# Patient Record
Sex: MALE | Race: WHITE | Employment: FULL TIME
[De-identification: names, ages, dates, MRNs, and addresses within clinical notes are randomized per-mention and may not be internally consistent; named-entity substitution may affect disease eponyms.]

---

## 2017-03-31 DIAGNOSIS — M25.551 HIP PAIN, RIGHT: ICD-10-CM

## 2017-03-31 DIAGNOSIS — Z79.82 BLEEDING RISK DUE TO COUMADIN AND ASPIRIN: Primary | ICD-10-CM

## 2017-03-31 DIAGNOSIS — Z79.01 BLEEDING RISK DUE TO COUMADIN AND ASPIRIN: Primary | ICD-10-CM

## 2017-03-31 LAB — INR PPP: 0.93 (ref 0.86–1.14)

## 2017-03-31 PROCEDURE — 85610 PROTHROMBIN TIME: CPT | Performed by: ORTHOPAEDIC SURGERY

## 2017-03-31 PROCEDURE — 36415 COLL VENOUS BLD VENIPUNCTURE: CPT | Performed by: ORTHOPAEDIC SURGERY

## 2017-04-03 DIAGNOSIS — Z79.82 BLEEDING RISK DUE TO COUMADIN AND ASPIRIN: ICD-10-CM

## 2017-04-03 DIAGNOSIS — M25.551 HIP PAIN, RIGHT: ICD-10-CM

## 2017-04-03 DIAGNOSIS — Z79.01 BLEEDING RISK DUE TO COUMADIN AND ASPIRIN: ICD-10-CM

## 2017-04-03 LAB — INR BLD: 1.1 (ref 0.86–1.14)

## 2017-04-03 PROCEDURE — 85610 PROTHROMBIN TIME: CPT | Mod: QW | Performed by: ORTHOPAEDIC SURGERY

## 2017-04-03 PROCEDURE — 36416 COLLJ CAPILLARY BLOOD SPEC: CPT | Performed by: ORTHOPAEDIC SURGERY

## 2017-04-06 DIAGNOSIS — Z79.01 BLEEDING RISK DUE TO COUMADIN AND ASPIRIN: ICD-10-CM

## 2017-04-06 DIAGNOSIS — Z79.82 BLEEDING RISK DUE TO COUMADIN AND ASPIRIN: ICD-10-CM

## 2017-04-06 DIAGNOSIS — M25.551 HIP PAIN, RIGHT: ICD-10-CM

## 2017-04-06 LAB — INR BLD: 1.6 (ref 0.86–1.14)

## 2017-04-06 PROCEDURE — 36416 COLLJ CAPILLARY BLOOD SPEC: CPT | Performed by: ORTHOPAEDIC SURGERY

## 2017-04-06 PROCEDURE — 85610 PROTHROMBIN TIME: CPT | Mod: QW | Performed by: ORTHOPAEDIC SURGERY

## 2017-04-10 DIAGNOSIS — Z79.82 BLEEDING RISK DUE TO COUMADIN AND ASPIRIN: ICD-10-CM

## 2017-04-10 DIAGNOSIS — Z79.01 BLEEDING RISK DUE TO COUMADIN AND ASPIRIN: ICD-10-CM

## 2017-04-10 DIAGNOSIS — M25.551 HIP PAIN, RIGHT: ICD-10-CM

## 2017-04-10 LAB — INR BLD: 2.3 (ref 0.86–1.14)

## 2017-04-10 PROCEDURE — 85610 PROTHROMBIN TIME: CPT | Mod: QW | Performed by: ORTHOPAEDIC SURGERY

## 2017-04-10 PROCEDURE — 36416 COLLJ CAPILLARY BLOOD SPEC: CPT | Performed by: ORTHOPAEDIC SURGERY

## 2017-04-13 DIAGNOSIS — M25.551 HIP PAIN, RIGHT: ICD-10-CM

## 2017-04-13 DIAGNOSIS — Z79.82 BLEEDING RISK DUE TO COUMADIN AND ASPIRIN: ICD-10-CM

## 2017-04-13 DIAGNOSIS — Z79.01 BLEEDING RISK DUE TO COUMADIN AND ASPIRIN: ICD-10-CM

## 2017-04-13 LAB — INR BLD: 2 (ref 0.86–1.14)

## 2017-04-13 PROCEDURE — 85610 PROTHROMBIN TIME: CPT | Mod: QW | Performed by: ORTHOPAEDIC SURGERY

## 2017-04-13 PROCEDURE — 36416 COLLJ CAPILLARY BLOOD SPEC: CPT | Performed by: ORTHOPAEDIC SURGERY

## 2017-04-17 DIAGNOSIS — Z79.82 BLEEDING RISK DUE TO COUMADIN AND ASPIRIN: ICD-10-CM

## 2017-04-17 DIAGNOSIS — M25.551 HIP PAIN, RIGHT: ICD-10-CM

## 2017-04-17 DIAGNOSIS — Z79.01 BLEEDING RISK DUE TO COUMADIN AND ASPIRIN: ICD-10-CM

## 2017-04-17 LAB — INR BLD: 1.7 (ref 0.86–1.14)

## 2017-04-17 PROCEDURE — 85610 PROTHROMBIN TIME: CPT | Mod: QW | Performed by: ORTHOPAEDIC SURGERY

## 2017-04-17 PROCEDURE — 36416 COLLJ CAPILLARY BLOOD SPEC: CPT | Performed by: ORTHOPAEDIC SURGERY

## 2017-04-20 DIAGNOSIS — Z79.01 LONG TERM (CURRENT) USE OF ANTICOAGULANTS: Primary | ICD-10-CM

## 2017-04-20 LAB — INR BLD: 1.6 (ref 0.86–1.14)

## 2017-04-20 PROCEDURE — 36416 COLLJ CAPILLARY BLOOD SPEC: CPT | Performed by: ORTHOPAEDIC SURGERY

## 2017-04-20 PROCEDURE — 85610 PROTHROMBIN TIME: CPT | Mod: QW | Performed by: ORTHOPAEDIC SURGERY

## 2017-04-24 DIAGNOSIS — Z79.01 LONG TERM (CURRENT) USE OF ANTICOAGULANTS: ICD-10-CM

## 2017-04-24 LAB — INR BLD: 1.7 (ref 0.86–1.14)

## 2017-04-24 PROCEDURE — 85610 PROTHROMBIN TIME: CPT | Mod: QW | Performed by: ORTHOPAEDIC SURGERY

## 2017-04-24 PROCEDURE — 36416 COLLJ CAPILLARY BLOOD SPEC: CPT | Performed by: ORTHOPAEDIC SURGERY

## 2022-02-04 ENCOUNTER — TRANSCRIBE ORDERS (OUTPATIENT)
Dept: OTHER | Age: 57
End: 2022-02-04
Payer: COMMERCIAL

## 2022-02-04 DIAGNOSIS — Z98.1 ARTHRODESIS STATUS: Primary | ICD-10-CM

## 2022-02-14 ENCOUNTER — HOSPITAL ENCOUNTER (OUTPATIENT)
Dept: PHYSICAL THERAPY | Facility: CLINIC | Age: 57
Setting detail: THERAPIES SERIES
End: 2022-02-14
Attending: PHYSICIAN ASSISTANT
Payer: COMMERCIAL

## 2022-02-14 DIAGNOSIS — Z98.1 ARTHRODESIS STATUS: ICD-10-CM

## 2022-02-14 PROCEDURE — 97110 THERAPEUTIC EXERCISES: CPT | Mod: GP | Performed by: PHYSICAL THERAPIST

## 2022-02-14 PROCEDURE — 97530 THERAPEUTIC ACTIVITIES: CPT | Mod: GP | Performed by: PHYSICAL THERAPIST

## 2022-02-14 PROCEDURE — 97161 PT EVAL LOW COMPLEX 20 MIN: CPT | Mod: GP | Performed by: PHYSICAL THERAPIST

## 2022-02-14 NOTE — PROGRESS NOTES
02/14/22 0800   General Information   Type of Visit Initial OP Ortho PT Evaluation   Start of Care Date 02/14/22   Referring Physician Dr. Alex Hamilton   Orders Evaluate and Treat   Date of Order 02/04/22   Certification Required? No   Medical Diagnosis S/p L4-5 lumbar fusion   Surgical/Medical history reviewed Yes   Precautions/Limitations spinal precautions   Weight-Bearing Status - LUE full weight-bearing   Weight-Bearing Status - RUE full weight-bearing   Weight-Bearing Status - LLE full weight-bearing   Weight-Bearing Status - RLE full weight-bearing   Body Part(s)   Body Part(s) Lumbar Spine/SI   Presentation and Etiology   Pertinent history of current problem (include personal factors and/or comorbidities that impact the POC) Pt presented to PT s/p Lumbar fusion on 1/5/22.  Pt notes that he has occasional L hip pain and rarely L lower leg pain.  Pt had previously had pain in B LE's prior to surgery.  Pt did report an infection right after surgery but this is doing better.  PMH: cancer, diabetes, L4-5 fusion 1/5/22, R LALITO.     Impairments A. Pain;D. Decreased ROM;E. Decreased flexibility;F. Decreased strength and endurance   Functional Limitations perform activities of daily living;perform required work activities;perform desired leisure / sports activities   Symptom Location L hip and lower leg.   How/Where did it occur From insidious onset   Onset date of current episode/exacerbation 01/05/22   Chronicity New   Pain rating (0-10 point scale) Best (/10);Worst (/10)   Best (/10) 2   Worst (/10) 8   Pain quality A. Sharp;C. Aching   Frequency of pain/symptoms B. Intermittent   Pain/symptoms are: The same all the time   Pain/symptoms exacerbated by A. Sitting;G. Certain positions   Pain/symptoms eased by B. Walking;H. Cold;I. OTC medication(s);J. Braces/supports   Progression of symptoms since onset: Improved   Current Level of Function   Current Community Support Family/friend caregiver   Patient  role/employment history A. Employed   Employment Comments working half days, , working at home   [facility]   Home/community accessibility 1 flight of stairs   Current equipment-Gait/Locomotion None   Current equipment-ADL None   Fall Risk Screen   Fall screen completed by PT   Have you fallen 2 or more times in the past year? No   Have you fallen and had an injury in the past year? No   Abuse Screen (yes response referral indicated)   Feels Unsafe at Home or Work/School no   Feels Threatened by Someone no   Does Anyone Try to Keep You From Having Contact with Others or Doing Things Outside Your Home? no   Physical Signs of Abuse Present no   Lumbar Spine/SI Objective Findings   Observation Pt wearing lumbar brace.     Gait/Locomotion No gait abnormalities identified.   Flexion ROM NT   Extension ROM NT   Right Side Bending ROM NT   Left Side Bending ROM NT   Repeated Extension-Standing ROM NT   Repeated Flexion-Standing ROM NT   Lumbar ROM Comment NT d/t post-op fusion.   Hip Flexion (L2) Strength 4/5 R   Hip Abduction Strength 4/5 B   Hip Extension Strength 4/5 B   Knee Flexion Strength 5/5   Knee Extension (L3) Strength 5/5   Ankle Dorsiflexion (L4) Strength 5/5   Great Toe Extension (L5) Strength 5/5   Ankle Plantar Flexion (S1) Strength 5/5   Patellar Tendon Reflexes  2+   Achilles Tendon Reflexes 2+   Planned Therapy Interventions   Planned Therapy Interventions manual therapy;motor coordination training;neuromuscular re-education;ROM;strengthening;stretching   Planned Modality Interventions   Planned Modality Interventions Cryotherapy;Electrical stimulation;Hot packs;TENS;Ultrasound   Planned Modality Interventions Comments as needed   Clinical Impression   Criteria for Skilled Therapeutic Interventions Met yes, treatment indicated   PT Diagnosis Low back pain   Influenced by the following impairments Pain, weakness, decreased activity tolerance   Functional limitations due to  impairments Prolonged sitting, stairs, prolonged standing.   Clinical Presentation Stable/Uncomplicated   Clinical Presentation Rationale Clinical judgement   Clinical Decision Making (Complexity) Low complexity   Therapy Frequency 2 times/Week   Predicted Duration of Therapy Intervention (days/wks) 12 weeks   Risk & Benefits of therapy have been explained Yes   Patient, Family & other staff in agreement with plan of care Yes   Clinical Impression Comments Pt is a 56 y.o. male who presented to PT with symptoms of L LE pain and weakness s/p Lumbar fusion.  Pt will benefit from skilled PT to improve lumbar motor control, posture/core stability, and symptom mangement techniques as indicated.   Education Assessment   Preferred Learning Style Listening;Demonstration;Pictures/video   Barriers to Learning No barriers   ORTHO GOALS   PT Ortho Eval Goals 1;2;3   Ortho Goal 1   Goal Identifier BOUCHRA   Goal Description Pt will demonstrate 10% improvement per BOUCHRA in order to demonstrate functional improvement of low back.   Target Date 05/09/22   Ortho Goal 2   Goal Identifier HEP   Goal Description Pt will be independent with HEP in order to improve lumbar motor control.   Target Date 03/28/22   Ortho Goal 3   Goal Identifier Ergonomics   Goal Description Pt will report more ideal ergonomic home work station in order to improve tolerance to work duties.   Target Date 03/28/22   Total Evaluation Time   PT Keegan Low Complexity Minutes (79146) 15

## 2022-02-17 ENCOUNTER — HOSPITAL ENCOUNTER (OUTPATIENT)
Dept: PHYSICAL THERAPY | Facility: CLINIC | Age: 57
Setting detail: THERAPIES SERIES
End: 2022-02-17
Attending: PHYSICIAN ASSISTANT
Payer: COMMERCIAL

## 2022-02-17 PROCEDURE — 97530 THERAPEUTIC ACTIVITIES: CPT | Mod: GP | Performed by: PHYSICAL THERAPIST

## 2022-02-17 PROCEDURE — 97110 THERAPEUTIC EXERCISES: CPT | Mod: GP | Performed by: PHYSICAL THERAPIST

## 2022-02-17 PROCEDURE — 97112 NEUROMUSCULAR REEDUCATION: CPT | Mod: GP | Performed by: PHYSICAL THERAPIST

## 2022-02-25 ENCOUNTER — HOSPITAL ENCOUNTER (OUTPATIENT)
Dept: PHYSICAL THERAPY | Facility: CLINIC | Age: 57
Setting detail: THERAPIES SERIES
End: 2022-02-25
Attending: PHYSICIAN ASSISTANT
Payer: COMMERCIAL

## 2022-02-25 PROCEDURE — 97112 NEUROMUSCULAR REEDUCATION: CPT | Mod: GP | Performed by: PHYSICAL THERAPIST

## 2022-02-25 PROCEDURE — 97110 THERAPEUTIC EXERCISES: CPT | Mod: GP | Performed by: PHYSICAL THERAPIST

## 2022-02-28 ENCOUNTER — HOSPITAL ENCOUNTER (OUTPATIENT)
Dept: PHYSICAL THERAPY | Facility: CLINIC | Age: 57
Setting detail: THERAPIES SERIES
End: 2022-02-28
Attending: PHYSICIAN ASSISTANT
Payer: COMMERCIAL

## 2022-02-28 PROCEDURE — 97110 THERAPEUTIC EXERCISES: CPT | Mod: GP | Performed by: PHYSICAL THERAPIST

## 2022-02-28 PROCEDURE — 97530 THERAPEUTIC ACTIVITIES: CPT | Mod: GP | Performed by: PHYSICAL THERAPIST

## 2022-03-03 ENCOUNTER — HOSPITAL ENCOUNTER (OUTPATIENT)
Dept: PHYSICAL THERAPY | Facility: CLINIC | Age: 57
Setting detail: THERAPIES SERIES
End: 2022-03-03
Attending: PHYSICIAN ASSISTANT
Payer: COMMERCIAL

## 2022-03-03 PROCEDURE — 97110 THERAPEUTIC EXERCISES: CPT | Mod: GP | Performed by: PHYSICAL THERAPIST

## 2022-03-03 PROCEDURE — 97530 THERAPEUTIC ACTIVITIES: CPT | Mod: GP | Performed by: PHYSICAL THERAPIST

## 2022-03-14 ENCOUNTER — HOSPITAL ENCOUNTER (OUTPATIENT)
Dept: PHYSICAL THERAPY | Facility: CLINIC | Age: 57
Setting detail: THERAPIES SERIES
Discharge: HOME | End: 2022-03-14
Attending: PHYSICIAN ASSISTANT
Payer: COMMERCIAL

## 2022-03-14 PROCEDURE — 97110 THERAPEUTIC EXERCISES: CPT | Mod: GP | Performed by: PHYSICAL THERAPIST

## 2022-03-14 PROCEDURE — 97530 THERAPEUTIC ACTIVITIES: CPT | Mod: GP | Performed by: PHYSICAL THERAPIST

## 2022-03-16 ENCOUNTER — HOSPITAL ENCOUNTER (OUTPATIENT)
Dept: PHYSICAL THERAPY | Facility: CLINIC | Age: 57
Setting detail: THERAPIES SERIES
Discharge: HOME | End: 2022-03-16
Attending: PHYSICIAN ASSISTANT
Payer: COMMERCIAL

## 2022-03-16 PROCEDURE — 97112 NEUROMUSCULAR REEDUCATION: CPT | Mod: GP | Performed by: PHYSICAL THERAPIST

## 2022-03-16 PROCEDURE — 97110 THERAPEUTIC EXERCISES: CPT | Mod: GP | Performed by: PHYSICAL THERAPIST

## 2022-03-23 ENCOUNTER — HOSPITAL ENCOUNTER (OUTPATIENT)
Dept: PHYSICAL THERAPY | Facility: CLINIC | Age: 57
Setting detail: THERAPIES SERIES
Discharge: HOME | End: 2022-03-23
Attending: PHYSICIAN ASSISTANT
Payer: COMMERCIAL

## 2022-03-23 PROCEDURE — 97112 NEUROMUSCULAR REEDUCATION: CPT | Mod: GP | Performed by: PHYSICAL THERAPIST

## 2022-03-23 PROCEDURE — 97110 THERAPEUTIC EXERCISES: CPT | Mod: GP | Performed by: PHYSICAL THERAPIST

## 2022-03-28 ENCOUNTER — HOSPITAL ENCOUNTER (OUTPATIENT)
Dept: PHYSICAL THERAPY | Facility: CLINIC | Age: 57
Setting detail: THERAPIES SERIES
Discharge: HOME | End: 2022-03-28
Attending: PHYSICIAN ASSISTANT
Payer: COMMERCIAL

## 2022-03-28 PROCEDURE — 97530 THERAPEUTIC ACTIVITIES: CPT | Mod: GP | Performed by: PHYSICAL THERAPIST

## 2022-03-28 PROCEDURE — 97110 THERAPEUTIC EXERCISES: CPT | Mod: GP | Performed by: PHYSICAL THERAPIST

## 2022-04-04 ENCOUNTER — HOSPITAL ENCOUNTER (OUTPATIENT)
Dept: PHYSICAL THERAPY | Facility: CLINIC | Age: 57
Setting detail: THERAPIES SERIES
Discharge: HOME | End: 2022-04-04
Attending: PHYSICIAN ASSISTANT
Payer: COMMERCIAL

## 2022-04-04 PROCEDURE — 97530 THERAPEUTIC ACTIVITIES: CPT | Mod: GP | Performed by: PHYSICAL THERAPIST

## 2022-04-04 PROCEDURE — 97110 THERAPEUTIC EXERCISES: CPT | Mod: GP | Performed by: PHYSICAL THERAPIST

## 2022-04-14 ENCOUNTER — TRANSCRIBE ORDERS (OUTPATIENT)
Dept: OTHER | Age: 57
End: 2022-04-14
Payer: COMMERCIAL

## 2022-04-14 DIAGNOSIS — Z98.1 ARTHRODESIS STATUS: Primary | ICD-10-CM

## 2022-05-02 ENCOUNTER — HOSPITAL ENCOUNTER (OUTPATIENT)
Dept: PHYSICAL THERAPY | Facility: CLINIC | Age: 57
Setting detail: THERAPIES SERIES
Discharge: HOME | End: 2022-05-02
Attending: PHYSICIAN ASSISTANT
Payer: COMMERCIAL

## 2022-05-02 DIAGNOSIS — Z98.1 ARTHRODESIS STATUS: ICD-10-CM

## 2022-05-02 PROCEDURE — 97112 NEUROMUSCULAR REEDUCATION: CPT | Mod: GP | Performed by: PHYSICAL THERAPIST

## 2022-05-02 PROCEDURE — 97110 THERAPEUTIC EXERCISES: CPT | Mod: GP | Performed by: PHYSICAL THERAPIST

## 2022-05-09 ENCOUNTER — HOSPITAL ENCOUNTER (OUTPATIENT)
Dept: PHYSICAL THERAPY | Facility: CLINIC | Age: 57
Setting detail: THERAPIES SERIES
Discharge: HOME | End: 2022-05-09
Attending: PHYSICIAN ASSISTANT
Payer: COMMERCIAL

## 2022-05-09 PROCEDURE — 97110 THERAPEUTIC EXERCISES: CPT | Mod: GP | Performed by: PHYSICAL THERAPIST

## 2022-05-16 ENCOUNTER — HOSPITAL ENCOUNTER (OUTPATIENT)
Dept: PHYSICAL THERAPY | Facility: CLINIC | Age: 57
Setting detail: THERAPIES SERIES
Discharge: HOME | End: 2022-05-16
Attending: PHYSICIAN ASSISTANT
Payer: COMMERCIAL

## 2022-05-16 PROCEDURE — 97110 THERAPEUTIC EXERCISES: CPT | Mod: GP | Performed by: PHYSICAL THERAPIST

## 2022-05-16 PROCEDURE — 97530 THERAPEUTIC ACTIVITIES: CPT | Mod: GP | Performed by: PHYSICAL THERAPIST

## 2022-05-23 ENCOUNTER — HOSPITAL ENCOUNTER (OUTPATIENT)
Dept: PHYSICAL THERAPY | Facility: CLINIC | Age: 57
Setting detail: THERAPIES SERIES
Discharge: HOME | End: 2022-05-23
Attending: PHYSICIAN ASSISTANT
Payer: COMMERCIAL

## 2022-05-23 PROCEDURE — 97530 THERAPEUTIC ACTIVITIES: CPT | Mod: GP | Performed by: PHYSICAL THERAPIST

## 2022-05-23 PROCEDURE — 97110 THERAPEUTIC EXERCISES: CPT | Mod: GP | Performed by: PHYSICAL THERAPIST

## 2022-06-06 ENCOUNTER — HOSPITAL ENCOUNTER (OUTPATIENT)
Dept: PHYSICAL THERAPY | Facility: CLINIC | Age: 57
Setting detail: THERAPIES SERIES
Discharge: HOME | End: 2022-06-06
Attending: PHYSICIAN ASSISTANT
Payer: COMMERCIAL

## 2022-06-06 PROCEDURE — 97110 THERAPEUTIC EXERCISES: CPT | Mod: GP | Performed by: PHYSICAL THERAPIST

## 2022-06-06 PROCEDURE — 97530 THERAPEUTIC ACTIVITIES: CPT | Mod: GP | Performed by: PHYSICAL THERAPIST

## 2022-07-20 ENCOUNTER — TRANSCRIBE ORDERS (OUTPATIENT)
Dept: OTHER | Age: 57
End: 2022-07-20

## 2022-07-20 DIAGNOSIS — Z98.1 ARTHRODESIS STATUS: Primary | ICD-10-CM

## 2022-08-12 ENCOUNTER — HOSPITAL ENCOUNTER (OUTPATIENT)
Dept: PHYSICAL THERAPY | Facility: CLINIC | Age: 57
Setting detail: THERAPIES SERIES
Discharge: HOME | End: 2022-08-12
Attending: PHYSICIAN ASSISTANT
Payer: COMMERCIAL

## 2022-08-12 PROCEDURE — 97530 THERAPEUTIC ACTIVITIES: CPT | Mod: GP | Performed by: PHYSICAL THERAPIST

## 2022-08-12 PROCEDURE — 97110 THERAPEUTIC EXERCISES: CPT | Mod: GP | Performed by: PHYSICAL THERAPIST

## 2022-08-25 NOTE — PROGRESS NOTES
[facility]    Outpatient Physical Therapy Discharge Note  Patient: Saw Hector  : 1965    Beginning/End Dates of Reporting Period:  22 to 22 (pt seen for 16 PT visits in that time, cancelled  and 22)    Referring Provider: Dr. Alex Hamilton    Therapy Diagnosis: Low back pain, s/p lumbar fusion 22     Client Self Report: Pt notes he can not do bending and twisting per restrictions. Pt has been off the brace x 2 weeks but will use if really active. Took MRI and not totally fused and healing in a different different pattern than how it normally heals. Not able to sleep in bed normally yet. Pain L low back. Pain with in L hip with walking more than 15'.  will get L LALITO.    Objective Measurements:22  Objective Measure: Pain  Details: Still gets LBP when lying in bed but most of the time no significant LBP. Main pain is from L hip that he may get a LALITO in October.   Objective Measure: LEFS (at eval 25/80) (31/80 on 3/14/22)(37/80 on 22 (44/80 on 22)  Details: 49/80 on 22        Goals:  Goal Identifier LEFS   Goal Description Pt will demonstrate 10% improvement per LEFS in order to demonstrate functional improvement of low back.   Target Date 22   Date Met  22   Progress (detail required for progress note): 37/80 on 22     Goal Identifier HEP   Goal Description Pt will be independent with HEP in order to improve lumbar motor control.   Target Date 22   Date Met  22   Progress (detail required for progress note): has good understanding of HEP     Goal Identifier Ergonomics   Goal Description Pt will report more ideal ergonomic home work station in order to improve tolerance to work duties.   Target Date 22   Date Met  22   Progress (detail required for progress note): pt has returned to work since 22 and  good toleration when reporting at 5/2/22 visit     Goal Identifier Function   Goal Description pt will further improve strength and carry over to improved functional level as measured by LEFS score increase to 49/80 or higher   Target Date 06/02/22   Date Met  06/06/22   Progress (detail required for progress note): 49/80 6/6/22       Plan:  Discharge from therapy.    Discharge:    Reason for Discharge: Patient has met all goals. PT will be having L LALITO in October of 2022 so pt should continue home program and some of our limitations for further progression of ex/activity is due to L hip DJD.     Equipment Issued: [email or web address]    Discharge Plan: Patient to continue home program.

## 2023-02-20 ENCOUNTER — TRANSCRIBE ORDERS (OUTPATIENT)
Dept: OTHER | Age: 58
End: 2023-02-20

## 2023-02-20 DIAGNOSIS — Z98.1 ARTHRODESIS STATUS: Primary | ICD-10-CM

## 2023-03-24 ENCOUNTER — HOSPITAL ENCOUNTER (OUTPATIENT)
Dept: PHYSICAL THERAPY | Facility: CLINIC | Age: 58
Setting detail: THERAPIES SERIES
Discharge: HOME | End: 2023-03-24
Attending: ORTHOPAEDIC SURGERY
Payer: COMMERCIAL

## 2023-03-24 PROCEDURE — 97161 PT EVAL LOW COMPLEX 20 MIN: CPT | Mod: GP | Performed by: PHYSICAL THERAPIST

## 2023-03-24 PROCEDURE — 97112 NEUROMUSCULAR REEDUCATION: CPT | Mod: GP | Performed by: PHYSICAL THERAPIST

## 2023-03-24 PROCEDURE — 97110 THERAPEUTIC EXERCISES: CPT | Mod: GP | Performed by: PHYSICAL THERAPIST

## 2023-03-24 NOTE — PROGRESS NOTES
"   03/24/23 1300   General Information   Type of Visit Initial OP Ortho PT Evaluation   Start of Care Date 03/24/23   Referring Physician Jeanette Casey MD   Patient/Family Goals Statement feel more level with standing and walking   Orders Evaluate and Treat   Date of Order 02/20/23   Certification Required? No   Medical Diagnosis L 4-5 OLLIF 1/5/22, arthorodesis status   Surgical/Medical history reviewed Yes   Precautions/Limitations   (pt notes he is still not supposed to do heavy lifting, 35# restriction)   Body Part(s)   Body Part(s) Lumbar Spine/SI   Presentation and Etiology   Pertinent history of current problem (include personal factors and/or comorbidities that impact the POC) Pt underwent L 4-5 OLLIF on 1/5/22, prior to that had L LALITO Oct of 2022, R LALITO 2016. After back surgery he felt like his L LE was longer. After L LALITO it did not change the leg length difference but did help with L hip pain. Has a 3/8\" lift for R shoe since February which helps him feel more level. If sore takes tylenol or IBP. Doing hip exercises for strengthening. Pt still noting gradual improvement since surgery and was told the fusion is over 50% fused now.   Impairments A. Pain;H. Impaired gait   Functional Limitations perform activities of daily living;perform required work activities;perform desired leisure / sports activities   Symptom Location B low back, L anterior thigh, gets L frantz horse in calf at night   How/Where did it occur From Degenerative Joint Disease   Onset date of current episode/exacerbation 01/05/22   Chronicity Chronic   Pain rating (0-10 point scale) Best (/10);Worst (/10)   Best (/10) LBP and LE sx usually 0/10   Worst (/10) LBP 5-6/10, LE 3-4/10   Pain quality B. Dull;C. Aching;G. Cramping   Frequency of pain/symptoms B. Intermittent  (LBP 10% or less, LE 5% or less, cramping L calf 2 nights per week)   Pain/symptoms are: Worse during the night   Pain/symptoms exacerbated by B. Walking;J. ADL;K. " "Home tasks;L. Work tasks;M. Other   Pain exacerbation comment standing   Pain/symptoms eased by C. Rest;E. Changing positions   Progression of symptoms since onset: Improved   Prior Level of Function   Prior Level of Function-Mobility ind   Prior Level of Function-ADLs ind   Functional Level Prior Comment was not uneven when walking   Current Level of Function   Patient role/employment history A. Employed   Employment Comments , sits 85% of the day   Fall Risk Screen   Fall screen completed by PT   Have you fallen 2 or more times in the past year? No   Have you fallen and had an injury in the past year? No   Is patient a fall risk? No   Abuse Screen (yes response referral indicated)   Feels Unsafe at Home or Work/School no   Feels Threatened by Someone no   Does Anyone Try to Keep You From Having Contact with Others or Doing Things Outside Your Home? no   Physical Signs of Abuse Present no   Lumbar Spine/SI Objective Findings   Gait/Locomotion lateral lurch present   Lumbar/Hip/Knee/Foot Strength Comments sit to stands in 30\" = 13 reps (normal for age 14-19 reps) pt had less L LE wt bearing with sit to stands   Palpation In hooklying B tibias symmetrical, R distal femur higher, in supine R ASIS lower, in prone L ischial tuberosity lower.   Observation No pain behaviors at rest. 0/10 low back and LE sx in sitting and standing.   Posture in sitting pt leans to R. In standing reduced lumbar lordosis L iliac crest 1\" or more higher, decreased L LE wt bearing, if pt puts equal wt bearing notes low back pull. L Gr troch also higher than R   Planned Therapy Interventions   Planned Therapy Interventions ROM;strengthening;stretching;neuromuscular re-education   Planned Therapy Interventions Comment manual therapy if needed   Clinical Impression   Criteria for Skilled Therapeutic Interventions Met yes, treatment indicated   PT Diagnosis LBP, pelvic obliquity   Influenced by the following impairments pain, ROM, " weakness, R anterior rotation, L posterior rotation   Functional limitations due to impairments standing, walking, adls   Clinical Presentation Stable/Uncomplicated   Clinical Presentation Rationale clinical judgement   Clinical Decision Making (Complexity) Low complexity   Therapy Frequency other (see comments)   Predicted Duration of Therapy Intervention (days/wks) up to 4 visits over the next month   Risk & Benefits of therapy have been explained Yes   Patient, Family & other staff in agreement with plan of care Yes   Education Assessment   Preferred Learning Style Listening   Barriers to Learning No barriers   ORTHO GOALS   PT Ortho Eval Goals 1;2   Ortho Goal 1   Goal Identifier Home program   Goal Description Pt will have good understanding of HEP to help keep his pelvis more level   Target Date 04/24/23   Ortho Goal 2   Goal Identifier Pelvic obliuity   Goal Description pt will maintain pelvic correction so he feels less off with standing and walking with adl's, reporting 50% or greater improvement   Target Date 04/24/23   Total Evaluation Time   PT Eval, Low Complexity Minutes (33233) 20

## 2023-04-14 ENCOUNTER — HOSPITAL ENCOUNTER (OUTPATIENT)
Dept: PHYSICAL THERAPY | Facility: CLINIC | Age: 58
Setting detail: THERAPIES SERIES
Discharge: HOME | End: 2023-04-14
Attending: ORTHOPAEDIC SURGERY
Payer: COMMERCIAL

## 2023-04-14 PROCEDURE — 97110 THERAPEUTIC EXERCISES: CPT | Mod: GP | Performed by: PHYSICAL THERAPIST

## 2023-04-14 PROCEDURE — 97530 THERAPEUTIC ACTIVITIES: CPT | Mod: GP | Performed by: PHYSICAL THERAPIST

## 2023-04-14 PROCEDURE — 97112 NEUROMUSCULAR REEDUCATION: CPT | Mod: GP | Performed by: PHYSICAL THERAPIST

## 2023-04-14 NOTE — PROGRESS NOTES
[facility]    Outpatient Physical Therapy Discharge Note  Patient: Saw Hector  : 1965    Beginning/End Dates of Reporting Period:  3/24/23 to 23 (pt seen for 2 PT visits in that time)    Referring Provider: Jeanette Casey MD    Therapy Diagnosis: LBP, pelvic obliquity     Client Self Report: Moving to [place] soon and leaving [place] so today is the last visti. Quads have been sore. Hasn't had much for pain. Hasn't been wearing shoe lift. Has good understanding of HEP, can DC PT.    Objective Measurements:23  Objective Measure: Sitting posture (leans to R at eval on 3/24/23)  Details: pt more aware of not leaning to R.  Objective Measure: Standing posture (75% R LE wt bearing, L knee flexed, L iliac crest higher at eval)  Details: getting more equal, L iliac crest is slightly higher.  Objective Measure: Pelvic obliquity (R AR, L GA at eval)  Details: level today  Objective Measure: Riley/BOUCHRA  Details: at evaluation on 3/24/23 low Riley 1,2/BOUCHRA 28%         Goals:  Goal Identifier Home program   Goal Description Pt will have good understanding of HEP to help keep his pelvis more level   Target Date 23   Date Met  23   Progress (detail required for progress note): pt with good understanding off HEP at 23 final visit.     Goal Identifier Pelvic obliuity   Goal Description pt will maintain pelvic correction so he feels less off with standing and walking with adl's, reporting 50% or greater improvement   Target Date 23   Date Met  23   Progress (detail required for progress note): notes 50% improvement, more level. at 23 visit       Plan:  Discharge from therapy.    Discharge:    Reason for Discharge: Patient has met all goals.    Equipment Issued: none    Discharge Plan: Patient to continue home program.

## 2023-04-23 ENCOUNTER — HEALTH MAINTENANCE LETTER (OUTPATIENT)
Age: 58
End: 2023-04-23

## 2024-06-30 ENCOUNTER — HEALTH MAINTENANCE LETTER (OUTPATIENT)
Age: 59
End: 2024-06-30

## 2025-07-13 ENCOUNTER — HEALTH MAINTENANCE LETTER (OUTPATIENT)
Age: 60
End: 2025-07-13